# Patient Record
Sex: MALE | Race: OTHER | HISPANIC OR LATINO | Employment: UNEMPLOYED | URBAN - METROPOLITAN AREA
[De-identification: names, ages, dates, MRNs, and addresses within clinical notes are randomized per-mention and may not be internally consistent; named-entity substitution may affect disease eponyms.]

---

## 2020-07-08 ENCOUNTER — HOSPITAL ENCOUNTER (EMERGENCY)
Facility: HOSPITAL | Age: 70
Discharge: HOME | End: 2020-07-08
Attending: EMERGENCY MEDICINE | Admitting: EMERGENCY MEDICINE
Payer: COMMERCIAL

## 2020-07-08 ENCOUNTER — APPOINTMENT (EMERGENCY)
Dept: CT IMAGING | Facility: HOSPITAL | Age: 70
End: 2020-07-08
Payer: COMMERCIAL

## 2020-07-08 ENCOUNTER — APPOINTMENT (EMERGENCY)
Dept: ULTRASOUND IMAGING | Facility: HOSPITAL | Age: 70
End: 2020-07-08
Payer: COMMERCIAL

## 2020-07-08 VITALS
HEART RATE: 67 BPM | SYSTOLIC BLOOD PRESSURE: 184 MMHG | RESPIRATION RATE: 16 BRPM | TEMPERATURE: 97.5 F | OXYGEN SATURATION: 100 % | WEIGHT: 149.25 LBS | DIASTOLIC BLOOD PRESSURE: 81 MMHG

## 2020-07-08 DIAGNOSIS — E80.6 HYPERBILIRUBINEMIA: ICD-10-CM

## 2020-07-08 DIAGNOSIS — R31.9 HEMATURIA: ICD-10-CM

## 2020-07-08 DIAGNOSIS — E87.5 HYPERKALEMIA: ICD-10-CM

## 2020-07-08 DIAGNOSIS — N28.1 RENAL CYST: ICD-10-CM

## 2020-07-08 DIAGNOSIS — K74.60 CIRRHOSIS (HCC): ICD-10-CM

## 2020-07-08 DIAGNOSIS — K80.20 CHOLELITHIASIS: ICD-10-CM

## 2020-07-08 DIAGNOSIS — B20 HIV (HUMAN IMMUNODEFICIENCY VIRUS INFECTION) (HCC): ICD-10-CM

## 2020-07-08 DIAGNOSIS — R10.84 GENERALIZED ABDOMINAL PAIN: Primary | ICD-10-CM

## 2020-07-08 DIAGNOSIS — I81 PORTAL VEIN THROMBOSIS: ICD-10-CM

## 2020-07-08 LAB
ALBUMIN SERPL BCP-MCNC: 3.5 G/DL (ref 3.5–5)
ALP SERPL-CCNC: 74 U/L (ref 46–116)
ALT SERPL W P-5'-P-CCNC: 16 U/L (ref 12–78)
ANION GAP SERPL CALCULATED.3IONS-SCNC: 12 MMOL/L (ref 4–13)
APTT PPP: 30 SECONDS (ref 23–37)
AST SERPL W P-5'-P-CCNC: 50 U/L (ref 5–45)
BACTERIA UR QL AUTO: ABNORMAL /HPF
BASOPHILS # BLD AUTO: 0.02 THOUSANDS/ΜL (ref 0–0.1)
BASOPHILS NFR BLD AUTO: 0 % (ref 0–1)
BILIRUB SERPL-MCNC: 1.34 MG/DL (ref 0.2–1)
BILIRUB UR QL STRIP: NEGATIVE
BUN SERPL-MCNC: 20 MG/DL (ref 5–25)
CALCIUM SERPL-MCNC: 8.4 MG/DL (ref 8.3–10.1)
CHLORIDE SERPL-SCNC: 108 MMOL/L (ref 100–108)
CLARITY UR: CLEAR
CLARITY, POC: CLEAR
CO2 SERPL-SCNC: 19 MMOL/L (ref 21–32)
COLOR UR: YELLOW
COLOR, POC: YELLOW
CREAT SERPL-MCNC: 0.94 MG/DL (ref 0.6–1.3)
EOSINOPHIL # BLD AUTO: 0.12 THOUSAND/ΜL (ref 0–0.61)
EOSINOPHIL NFR BLD AUTO: 3 % (ref 0–6)
ERYTHROCYTE [DISTWIDTH] IN BLOOD BY AUTOMATED COUNT: 15.6 % (ref 11.6–15.1)
GFR SERPL CREATININE-BSD FRML MDRD: 82 ML/MIN/1.73SQ M
GLUCOSE SERPL-MCNC: 122 MG/DL (ref 65–140)
GLUCOSE UR STRIP-MCNC: NEGATIVE MG/DL
HCT VFR BLD AUTO: 40.4 % (ref 36.5–49.3)
HGB BLD-MCNC: 13.3 G/DL (ref 12–17)
HGB UR QL STRIP.AUTO: ABNORMAL
IMM GRANULOCYTES # BLD AUTO: 0.01 THOUSAND/UL (ref 0–0.2)
IMM GRANULOCYTES NFR BLD AUTO: 0 % (ref 0–2)
INR PPP: 1.17 (ref 0.84–1.19)
KETONES UR STRIP-MCNC: NEGATIVE MG/DL
LACTATE SERPL-SCNC: 1.6 MMOL/L (ref 0.5–2)
LEUKOCYTE ESTERASE UR QL STRIP: NEGATIVE
LIPASE SERPL-CCNC: 142 U/L (ref 73–393)
LYMPHOCYTES # BLD AUTO: 1.37 THOUSANDS/ΜL (ref 0.6–4.47)
LYMPHOCYTES NFR BLD AUTO: 29 % (ref 14–44)
MAGNESIUM SERPL-MCNC: 2.1 MG/DL (ref 1.6–2.6)
MCH RBC QN AUTO: 34.7 PG (ref 26.8–34.3)
MCHC RBC AUTO-ENTMCNC: 32.9 G/DL (ref 31.4–37.4)
MCV RBC AUTO: 106 FL (ref 82–98)
MONOCYTES # BLD AUTO: 0.61 THOUSAND/ΜL (ref 0.17–1.22)
MONOCYTES NFR BLD AUTO: 13 % (ref 4–12)
NEUTROPHILS # BLD AUTO: 2.54 THOUSANDS/ΜL (ref 1.85–7.62)
NEUTS SEG NFR BLD AUTO: 55 % (ref 43–75)
NITRITE UR QL STRIP: NEGATIVE
NON-SQ EPI CELLS URNS QL MICRO: ABNORMAL /HPF
NRBC BLD AUTO-RTO: 0 /100 WBCS
PH UR STRIP.AUTO: 5 [PH] (ref 4.5–8)
PLATELET # BLD AUTO: 69 THOUSANDS/UL (ref 149–390)
PMV BLD AUTO: 11.8 FL (ref 8.9–12.7)
POTASSIUM SERPL-SCNC: 5.8 MMOL/L (ref 3.5–5.3)
PROT SERPL-MCNC: 7.5 G/DL (ref 6.4–8.2)
PROT UR STRIP-MCNC: NEGATIVE MG/DL
PROTHROMBIN TIME: 15.1 SECONDS (ref 11.6–14.5)
RBC # BLD AUTO: 3.83 MILLION/UL (ref 3.88–5.62)
RBC #/AREA URNS AUTO: ABNORMAL /HPF
SODIUM SERPL-SCNC: 139 MMOL/L (ref 136–145)
SP GR UR STRIP.AUTO: >=1.03 (ref 1–1.03)
UROBILINOGEN UR QL STRIP.AUTO: 0.2 E.U./DL
WBC # BLD AUTO: 4.67 THOUSAND/UL (ref 4.31–10.16)
WBC #/AREA URNS AUTO: ABNORMAL /HPF

## 2020-07-08 PROCEDURE — 83690 ASSAY OF LIPASE: CPT | Performed by: NURSE PRACTITIONER

## 2020-07-08 PROCEDURE — 83605 ASSAY OF LACTIC ACID: CPT | Performed by: NURSE PRACTITIONER

## 2020-07-08 PROCEDURE — 99285 EMERGENCY DEPT VISIT HI MDM: CPT

## 2020-07-08 PROCEDURE — 36415 COLL VENOUS BLD VENIPUNCTURE: CPT | Performed by: NURSE PRACTITIONER

## 2020-07-08 PROCEDURE — 80053 COMPREHEN METABOLIC PANEL: CPT | Performed by: NURSE PRACTITIONER

## 2020-07-08 PROCEDURE — 85730 THROMBOPLASTIN TIME PARTIAL: CPT | Performed by: NURSE PRACTITIONER

## 2020-07-08 PROCEDURE — 85610 PROTHROMBIN TIME: CPT | Performed by: NURSE PRACTITIONER

## 2020-07-08 PROCEDURE — 96374 THER/PROPH/DIAG INJ IV PUSH: CPT

## 2020-07-08 PROCEDURE — 99285 EMERGENCY DEPT VISIT HI MDM: CPT | Performed by: EMERGENCY MEDICINE

## 2020-07-08 PROCEDURE — 74176 CT ABD & PELVIS W/O CONTRAST: CPT

## 2020-07-08 PROCEDURE — 85025 COMPLETE CBC W/AUTO DIFF WBC: CPT | Performed by: NURSE PRACTITIONER

## 2020-07-08 PROCEDURE — NC001 PR NO CHARGE: Performed by: SURGERY

## 2020-07-08 PROCEDURE — 76705 ECHO EXAM OF ABDOMEN: CPT

## 2020-07-08 PROCEDURE — 96361 HYDRATE IV INFUSION ADD-ON: CPT

## 2020-07-08 PROCEDURE — 96375 TX/PRO/DX INJ NEW DRUG ADDON: CPT

## 2020-07-08 PROCEDURE — 81001 URINALYSIS AUTO W/SCOPE: CPT

## 2020-07-08 PROCEDURE — 96376 TX/PRO/DX INJ SAME DRUG ADON: CPT

## 2020-07-08 PROCEDURE — 83735 ASSAY OF MAGNESIUM: CPT | Performed by: NURSE PRACTITIONER

## 2020-07-08 RX ORDER — HYDROMORPHONE HCL/PF 1 MG/ML
0.5 SYRINGE (ML) INJECTION ONCE
Status: COMPLETED | OUTPATIENT
Start: 2020-07-08 | End: 2020-07-08

## 2020-07-08 RX ORDER — ONDANSETRON 2 MG/ML
4 INJECTION INTRAMUSCULAR; INTRAVENOUS ONCE
Status: COMPLETED | OUTPATIENT
Start: 2020-07-08 | End: 2020-07-08

## 2020-07-08 RX ORDER — SODIUM CHLORIDE 9 MG/ML
125 INJECTION, SOLUTION INTRAVENOUS CONTINUOUS
Status: DISCONTINUED | OUTPATIENT
Start: 2020-07-08 | End: 2020-07-08 | Stop reason: HOSPADM

## 2020-07-08 RX ADMIN — IOHEXOL 50 ML: 240 INJECTION, SOLUTION INTRATHECAL; INTRAVASCULAR; INTRAVENOUS; ORAL at 06:53

## 2020-07-08 RX ADMIN — SODIUM CHLORIDE 125 ML/HR: 0.9 INJECTION, SOLUTION INTRAVENOUS at 04:18

## 2020-07-08 RX ADMIN — HYDROMORPHONE HYDROCHLORIDE 0.5 MG: 1 INJECTION, SOLUTION INTRAMUSCULAR; INTRAVENOUS; SUBCUTANEOUS at 07:18

## 2020-07-08 RX ADMIN — HYDROMORPHONE HYDROCHLORIDE 0.5 MG: 1 INJECTION, SOLUTION INTRAMUSCULAR; INTRAVENOUS; SUBCUTANEOUS at 04:10

## 2020-07-08 RX ADMIN — ONDANSETRON 4 MG: 2 INJECTION INTRAMUSCULAR; INTRAVENOUS at 04:09

## 2020-07-08 NOTE — CONSULTS
Consultation - Harrison ALTON Pierre 71 y o  male MRN: [id number]  Unit/Bed#: ED 17 Encounter: [id number]    Assessment/Plan     Assessment:  69yo M with HIV, cryptogenic cirrhosis, DM, HTN, HLD, TR, h/o choledocholithiasis s/p ERCP with stone removal c/b post-ERCP pancreatitis, who presents with symptomatic cholelithiasis and well as 2-3 mm ampullary stone  Tbili 1 34, but appears to be chronically elevated >1 3  Plan:  · CT and U/S imaging reviewed: low suspicion for acute cholecystitis, appears more c/w symptomatic cholelithiasis  · Patient follows with general surgery at [facility] for known gallbladder issues, and would prefer to continue to follow there should he require surgery  · Will attempt PO trial in ED; if patient able to tolerate PO intake, he can be discharged from ED to follow up with general surgery at [facility]      History of Present Illness     HPI:  Genie Miner is a 71 y o  male with PMHx of HIV, cryptogenic cirrhosis, DM, HTN, HLD, TR, h/o choledocholithiasis s/p ERCP with stone removal c/b post-ERCP pancreatitis, who presents with epigastric abdominal pain  He states the pain started last night after PO intake and persisted until this morning, prompting his visit to the ED  The pain has now resolved following BM earlier today  He is currently asymptomatic  He denies any fevers/nausea/vomiting/diarrhea or any associated symptoms  He has known cholelithiasis, for which he follows at [facility] and would prefer to do so moving forward  He has had abdominal surgery in the past, but it is unclear what procedure was performed (exploratory laparotomy with possible appendectomy?)  Consults    Review of Systems   All other systems reviewed and are negative    (except as stated in HPI)      Historical Information   Past Medical History:   Diagnosis Date    Acute biliary pancreatitis     Anemia     Ascites     Asthma     BPH (benign prostatic hyperplasia)     Cardiomegaly     Chronic [facility] ([facility] )     [facility]osis ([facility] )     CKD (chronic kidney disease)     Depression     Diabetes mellitus (HCC)     HOUSE (dyspnea on exertion)     Esophageal varices (HCC)     Gallstone     Glaucoma     Hepatic encephalopathy (HCC)     HIV (human immunodeficiency virus infection) (HCC)     Hyperlipidemia     Hypertension     IBS (irritable bowel syndrome)     Inguinal hernia     Nonrheumatic tricuspid valve regurgitation     Osteoarthritis     Portal hypertension (HCC)     Portal vein thrombosis     Pulmonary hypertension (HCC)     S/P ERCP     Thrombocytopenia (HCC)      Past Surgical History:   Procedure Laterality Date    ABDOMINAL SURGERY       Social History   Social History     Substance and Sexual Activity   Alcohol Use Never    Frequency: Never     Social History     Substance and Sexual Activity   Drug Use Never     E-Cigarette/Vaping     E-Cigarette/Vaping Substances     Social History     Tobacco Use   Smoking Status Never Smoker     Family History: History reviewed  No pertinent family history      Meds/Allergies   current meds:   Current Facility-Administered Medications   Medication Dose Route Frequency    sodium chloride 0 9 % infusion  125 mL/hr Intravenous Continuous     Allergies   Allergen Reactions    Dronabinol Other (See Comments)     Confusion and hallucinations    Hydrocodone-Acetaminophen Other (See Comments)    Iodinated Diagnostic Agents     Iothalamate Hives    Penicillins Other (See Comments)     Anaphylaxis; artem Rocephin    Ritonavir Other (See Comments)    Clindamycin Rash       Objective   First Vitals:   Blood Pressure: (!) 188/92 (07/08/20 0323)  Pulse: 58 (07/08/20 0323)  Temperature: 97 5 °F (36 4 °C) (07/08/20 0323)  Temp Source: Temporal (07/08/20 0323)  Respirations: 20 (07/08/20 0323)  Weight - Scale: 67 7 kg (149 lb 4 oz) (07/08/20 0323)  SpO2: 100 % (07/08/20 0323)    Current Vitals:   Blood Pressure: (!) 159/103 (07/08/20 0725)  Pulse: 86 (07/08/20 0725)  Temperature: 97 5 °F (36 4 °C) (07/08/20 0323)  Temp Source: Temporal (07/08/20 0323)  Respirations: 15 (07/08/20 0725)  Weight - Scale: 67 7 kg (149 lb 4 oz) (07/08/20 0323)  SpO2: 100 % (07/08/20 0725)    No intake or output data in the 24 hours ending 07/08/20 1016    Invasive Devices     Peripheral Intravenous Line            Peripheral IV 07/08/20 Right Forearm less than 1 day                Physical Exam   Constitutional: He is oriented to person, place, and time  He appears cachectic  No distress  HENT:   Head: Normocephalic and atraumatic  Eyes: EOM are normal    Neck: Normal range of motion  Cardiovascular: Normal rate, regular rhythm and intact distal pulses  Pulmonary/Chest: Effort normal  No respiratory distress  Abdominal: Soft  He exhibits no distension and no fluid wave  There is no tenderness  There is no rigidity, no rebound, no guarding and negative Griffin's sign  Genitourinary:   Genitourinary Comments: Deferred   Musculoskeletal: Normal range of motion  He exhibits no edema or deformity  Neurological: He is alert and oriented to person, place, and time  Skin: Skin is warm and dry  No rash noted  He is not diaphoretic  No erythema  No pallor  Vitals reviewed        Lab Results:   CBC:   Lab Results   Component Value Date    WBC 4 67 07/08/2020    HGB 13 3 07/08/2020    HCT 40 4 07/08/2020     (H) 07/08/2020    PLT 69 (L) 07/08/2020    MCH 34 7 (H) 07/08/2020    MCHC 32 9 07/08/2020    RDW 15 6 (H) 07/08/2020    MPV 11 8 07/08/2020    NRBC 0 07/08/2020   , CMP:   Lab Results   Component Value Date    SODIUM 139 07/08/2020    K 5 8 (H) 07/08/2020     07/08/2020    CO2 19 (L) 07/08/2020    BUN 20 07/08/2020    CREATININE 0 94 07/08/2020    CALCIUM 8 4 07/08/2020    AST 50 (H) 07/08/2020    ALT 16 07/08/2020    ALKPHOS 74 07/08/2020    EGFR 82 07/08/2020   Coagulation:   Lab Results   Component Value Date    INR 1 17 07/08/2020   Lipase:   Lab Results Component Value Date    LIPASE 142 07/08/2020     Imaging: I have personally reviewed pertinent reports  US gallbladder   Final Result by Antonio Condon MD (07/08 5024)   1  Cholelithiasis with mild gallbladder wall thickening but no significant pericholecystic fluid, CBD dilatation or sonographic Griffin's sign  Findings remain equivocal for cholecystitis  2   Right lower renal pole cortical cyst      3   Heterogeneous hepatic echotexture with suspected thrombosis of the portal vein  A contrast enhanced CT should be confirmatory if clinically appropriate  The study was marked in EPIC for significant notification  Workstation performed: [id number]         CT abdomen pelvis wo contrast   Final Result by Margaret Ocampo MD (07/08 9142)      3 x 2 mm stone seen at the ampulla  No significant dilatation of the intra or extrahepatic bile ducts on this unenhanced exam   Gallbladder is nondistended but does show evidence of stones and mild pericholecystic infiltration  Nonspecific peripheral reticular interstitial thickening and minimal subsegmental opacity in the right lung base  The study was marked in [facility] for immediate notification  Workstation performed: [id number]            EKG, Pathology, and Other Studies: I have personally reviewed pertinent reports

## 2020-07-08 NOTE — DISCHARGE INSTRUCTIONS
DISCHARGE INSTRUCTIONS:    FOLLOW UP WITH YOUR PRIMARY CARE PROVIDER OR THE [address] [place] [address]  MAKE AN APPOINTMENT TO BE SEEN  REST AND DRINK PLENTY OF FLUIDS  FOLLOW UP WITH YOUR SURGEON  IF SYMPTOMS WORSEN OR NEW SYMPTOMS ARISE, RETURN TO THE ER TO BE SEEN

## 2020-07-08 NOTE — ED PROVIDER NOTES
History  Chief Complaint   Patient presents with    Abdominal Pain     Pt reports mid quad abd pain since last night  Pt denies NVD  Denies fever  This is a cachetic appearing 71year old male with a anorexia, HIV, HTN, cryptogenic cirrhosis, autonomic dysfunction, mod-severe TR htn, dm and biliary pancreatitis, April this year for latter and s/p ercp w/ stone removal   He states he has had abdominal surgery but is unable to elaborate on specific surgery  He denies fevers, v/d   Last BM yesterday    All patient care done at [facility] THE [facility]      Past Medical History:  Diagnosis Date   Anemia    Asthma    BPH (benign prostatic hyperplasia)    Cardiomegaly    Chronic hepatitis (HCC)    Cirrhosis (HCC)    CKD (chronic kidney disease)    HOUSE (dyspnea on exertion)    Esophageal varices (HCC)    Glaucoma    Hepatic encephalopathy (HCC)    HIV (human immunodeficiency virus infection) (HCC)    Hyperlipidemia    Hypertension    IBS (irritable bowel syndrome)    Inguinal hernia    Nonrheumatic tricuspid valve regurgitation    Osteoarthritis    Portal hypertension (HCC)    Portal vein thrombosis    Pulmonary hypertension (HCC)    Thrombocytopenia (HCC)     5/27/20  CD4 25  CD4 absolute 328       Abdominal Pain   Associated symptoms: nausea        None       Past Medical History:   Diagnosis Date    Acute biliary pancreatitis     Anemia     Ascites     Asthma     BPH (benign prostatic hyperplasia)     Cardiomegaly     Chronic hepatitis (HCC)     Cirrhosis (HCC)     CKD (chronic kidney disease)     Depression     Diabetes mellitus (HCC)     HOUSE (dyspnea on exertion)     Esophageal varices (HCC)     Gallstone     Glaucoma     Hepatic encephalopathy (HCC)     HIV (human immunodeficiency virus infection) ([address] )     Hyperlipidemia     Hypertension     IBS (irritable bowel syndrome)     Inguinal hernia     Nonrheumatic tricuspid valve regurgitation     Osteoarthritis     Portal hypertension ([address] )  Portal vein thrombosis     Pulmonary hypertension (HCC)     S/P ERCP     Thrombocytopenia (HCC)        Past Surgical History:   Procedure Laterality Date    ABDOMINAL SURGERY         History reviewed  No pertinent family history  I have reviewed and agree with the history as documented  E-Cigarette/Vaping     E-Cigarette/Vaping Substances     Social History     Tobacco Use    Smoking status: Never Smoker   Substance Use Topics    Alcohol use: Never     Frequency: Never    Drug use: Never       Review of Systems   Gastrointestinal: Positive for abdominal distention, abdominal pain and nausea  All other systems reviewed and are negative  Physical Exam  Physical Exam   Constitutional: He is oriented to person, place, and time  Non-toxic appearance  He does not appear ill  He appears distressed  Cachetic appearing male  Rubbing his abdomen and moaning    HENT:   Head: Normocephalic and atraumatic  Eyes: Pupils are equal, round, and reactive to light  EOM are normal    Cardiovascular: Normal rate, regular rhythm and normal heart sounds  Pulmonary/Chest: Effort normal and breath sounds normal    Abdominal: Bowel sounds are normal  There is generalized tenderness and tenderness in the epigastric area  Neurological: He is alert and oriented to person, place, and time  Skin: Skin is warm and dry  Capillary refill takes less than 2 seconds  Psychiatric: He has a normal mood and affect  His behavior is normal    Nursing note and vitals reviewed        Vital Signs  ED Triage Vitals   Temperature Pulse Respirations Blood Pressure SpO2   07/08/20 0323 07/08/20 0323 07/08/20 0323 07/08/20 0323 07/08/20 0323   97 5 °F (36 4 °C) 58 20 (!) 188/92 100 %      Temp Source Heart Rate Source Patient Position - Orthostatic VS BP Location FiO2 (%)   07/08/20 0323 07/08/20 0323 07/08/20 0453 07/08/20 0323 --   Temporal Monitor Lying Right arm       Pain Score       07/08/20 0410       Worst Possible Pain Vitals:    07/08/20 0323 07/08/20 0430 07/08/20 0453   BP: (!) 188/92 (!) 189/86 (!) 189/89   Pulse: 58 60 60   Patient Position - Orthostatic VS:   Lying         Visual Acuity      ED Medications  Medications   sodium chloride 0 9 % infusion (125 mL/hr Intravenous New Bag 7/8/20 0418)   ondansetron (ZOFRAN) injection 4 mg (4 mg Intravenous Given 7/8/20 0409)   HYDROmorphone (DILAUDID) injection 0 5 mg (0 5 mg Intravenous Given 7/8/20 0410)   iohexol (OMNIPAQUE) 240 MG/ML solution 50 mL (50 mL Oral Given 7/8/20 0653)       Diagnostic Studies  Results Reviewed     Procedure Component Value Units Date/Time    Urine Microscopic [[id number]]  (Abnormal) Collected:  07/08/20 0500    Lab Status:  Final result Specimen:  Urine, Other Updated:  07/08/20 0559     RBC, UA 1-2 /hpf      WBC, UA None Seen /hpf      Epithelial Cells Occasional /hpf      Bacteria, UA Occasional /hpf     Comprehensive metabolic panel [[id number]]  (Abnormal) Collected:  07/08/20 0411    Lab Status:  Final result Specimen:  Blood from Arm, Right Updated:  07/08/20 0555     Sodium 139 mmol/L      Potassium 5 8 mmol/L      Chloride 108 mmol/L      CO2 19 mmol/L      ANION GAP 12 mmol/L      BUN 20 mg/dL      Creatinine 0 94 mg/dL      Glucose 122 mg/dL      Calcium 8 4 mg/dL      AST 50 U/L      ALT 16 U/L      Alkaline Phosphatase 74 U/L      Total Protein 7 5 g/dL      Albumin 3 5 g/dL      Total Bilirubin 1 34 mg/dL      eGFR 82 ml/min/1 73sq m     Narrative:       [place] guidelines for Chronic Kidney Disease (CKD):     Stage 1 with normal or high GFR (GFR > 90 mL/min/1 73 square meters)    Stage 2 Mild CKD (GFR = 60-89 mL/min/1 73 square meters)    Stage 3A Moderate CKD (GFR = 45-59 mL/min/1 73 square meters)    Stage 3B Moderate CKD (GFR = 30-44 mL/min/1 73 square meters)    Stage 4 Severe CKD (GFR = 15-29 mL/min/1 73 square meters)    Stage 5 End Stage CKD (GFR <15 mL/min/1 73 square meters)  Note: GFR calculation is accurate only with a steady state creatinine    Magnesium [[id number]]  (Normal) Collected:  07/08/20 0411    Lab Status:  Final result Specimen:  Blood from Arm, Right Updated:  07/08/20 0555     Magnesium 2 1 mg/dL     Lipase [[id number]]  (Normal) Collected:  07/08/20 0411    Lab Status:  Final result Specimen:  Blood from Arm, Right Updated:  07/08/20 0555     Lipase 142 u/L     Lactic acid [[id number]]  (Normal) Collected:  07/08/20 0411    Lab Status:  Final result Specimen:  Blood from Arm, Right Updated:  07/08/20 0547     LACTIC ACID 1 6 mmol/L     Narrative:       Result may be elevated if tourniquet was used during collection      CBC and differential [[id number]]  (Abnormal) Collected:  07/08/20 0411    Lab Status:  Final result Specimen:  Blood from Arm, Right Updated:  07/08/20 0504     WBC 4 67 Thousand/uL      RBC 3 83 Million/uL      Hemoglobin 13 3 g/dL      Hematocrit 40 4 %       fL      MCH 34 7 pg      MCHC 32 9 g/dL      RDW 15 6 %      MPV 11 8 fL      Platelets 69 Thousands/uL      nRBC 0 /100 WBCs      Neutrophils Relative 55 %      Immat GRANS % 0 %      Lymphocytes Relative 29 %      Monocytes Relative 13 %      Eosinophils Relative 3 %      Basophils Relative 0 %      Neutrophils Absolute 2 54 Thousands/µL      Immature Grans Absolute 0 01 Thousand/uL      Lymphocytes Absolute 1 37 Thousands/µL      Monocytes Absolute 0 61 Thousand/µL      Eosinophils Absolute 0 12 Thousand/µL      Basophils Absolute 0 02 Thousands/µL     POCT urinalysis dipstick [[id number]]  (Abnormal) Resulted:  07/08/20 0503    Lab Status:  Final result Specimen:  Urine Updated:  07/08/20 0504     Color, UA yellow     Clarity, UA clear    Urine Macroscopic, POC [[id number]]  (Abnormal) Collected:  07/08/20 0500    Lab Status:  Final result Specimen:  Urine Updated:  07/08/20 0502     Color, UA Yellow     Clarity, UA Clear     pH, UA 5 0     Leukocytes, UA Negative     Nitrite, UA Negative     Protein, UA Negative mg/dl      Glucose, UA Negative mg/dl      Ketones, UA Negative mg/dl      Urobilinogen, UA 0 2 E U /dl      Bilirubin, UA Negative     Blood, UA Small     Specific Gravity, UA >=1 030    Conor Milton RESULT    Protime-INR [[id number]]  (Abnormal) Collected:  07/08/20 0411    Lab Status:  Final result Specimen:  Blood from Arm, Right Updated:  07/08/20 0458     Protime 15 1 seconds      INR 1 17    APTT [[id number]]  (Normal) Collected:  07/08/20 0411    Lab Status:  Final result Specimen:  Blood from Arm, Right Updated:  07/08/20 0458     PTT 30 seconds                  CT abdomen pelvis wo contrast   Final Result by Damian Staley MD (07/08 0659)      3 x 2 mm stone seen at the ampulla  No significant dilatation of the intra or extrahepatic bile ducts on this unenhanced exam   Gallbladder is nondistended but does show evidence of stones and mild pericholecystic infiltration  Nonspecific peripheral reticular interstitial thickening and minimal subsegmental opacity in the right lung base  The study was marked in [facility] for immediate notification  Workstation performed: [id number]         US gallbladder    (Results Pending)              Procedures  Procedures         ED Course  ED Course as of Jul 08 0709   Wed Jul 08, 2020   1660 RN states that pt had called him to room and stated he needed something else for pain, however when he returned to pt room he was sleeping         0611 K 5 8 slightly hemolyzed - pt is getting IVF  Potassium(!): 5 8   0612 LACTIC ACID: 1 6   0612 Magnesium: 2 1   0612 WBC: 4 67   0617 TOTAL BILIRUBIN(!): 1 34   0656 Labs reviewed no acute process  Waiting on rad read for dispo  Report to Nirav Bartlett [address] [place] [address] for review of CT and discussion with pt and dispo        0709 Bonnie JOVEL PAC will order US                                                  MDM  Number of Diagnoses or Management Options  Diagnosis management comments: Abdominal pain  (hx of HIV, cirrhosis, abdominal surgery)    DDX:   Ascites,  Constipation, SBO,     Plan          Disposition  Final diagnoses:   Generalized abdominal pain   Cirrhosis ([address] )   HIV [facility] immunodeficiency virus infection) ([facility] )     Time reflects when diagnosis was documented in both MDM as applicable and the Disposition within this note     Time User Action Codes Description Comment    7/8/2020  6:13 AM Sanna Lisa [R10 84] Generalized abdominal pain     7/8/2020  6:14 AM Chhaya Thompson [K74 60] [facility] ([facility] )     7/8/2020  6:14 AM Sanna Lisa [B20] HIV (human immunodeficiency virus infection) [facility])       ED Disposition     None      Follow-up Information    None         Patient's Medications    No medications on file     No discharge procedures on file      PDMP Review     None          ED Provider  Electronically Signed by           Benjamin House  07/08/20 7941

## 2020-07-08 NOTE — ED NOTES
Pt transported to [address] [place] [address], [address] [place] [address]  07/08/20 6372

## 2020-07-08 NOTE — ED CARE HANDOFF
Emergency Department Sign Out Note        Sign out and transfer of care from [facility]  See Separate Emergency Department note  The patient, Nico Figeuroa, was evaluated by the previous provider for mid abdominal pain  Workup Completed:  Urine  CMP  Magnesium  Lipase  Lactic  CBC  PT/INR  PTT    ED Course / Workup Pending (followup):  CT abdomen                          ED Course as of Jul 08 1152   Wed Jul 08, 2020   1413 Awaiting CT  Will decide disposition after  [address] with General surgery in regards to CT and US findings  They will see patient  [address] with Moreno Coles from general surgery  If patient can tolerate PO and not have pain, he can follow up with surgeon at [facility] for outpatient cholecystectomy  Will PO challenge  1119 Patient tolerating PO liquid and food without pain  Will discharge  Procedures  MDM  Number of Diagnoses or Management Options  Cholelithiasis: new and requires workup  Cirrhosis ([address] ): new and requires workup  Generalized abdominal pain: new and requires workup  Hematuria: new and requires workup  HIV (human immunodeficiency virus infection) ([address] ): new and requires workup  Hyperbilirubinemia: new and requires workup  Hyperkalemia: new and requires workup  Portal vein thrombosis: new and requires workup  Renal cyst: new and requires workup  Diagnosis management comments:   Per previous provider, plan is to await CT and decide disposition  CT shows stone at ampulla  Will check US to ensure no signs of acute cholecystitis  Patient agreeable to plan  Patient states he knows about this stone and is following with [facility] for it  He states he was told he needed to have it removed but has not scheduled an appointment yet  [address] with General surgery in regards to CT and US findings  They will see patient  [address] with Moreno Coles from general surgery   If patient can tolerate PO and not have pain, he can follow up with surgeon at [facility] for outpatient cholecystectomy  Will PO challenge  1119 Patient tolerating PO liquid and food without pain  Will discharge  Patient agreeable  At discharge, I instructed the patient to:  -follow up with pcp  -rest and drink plenty of fluids  -follow up with general surgery  -return to the ER if symptoms worsened or new symptoms arose  Patient agreed to this plan and was stable at time of discharge  Amount and/or Complexity of Data Reviewed  Clinical lab tests: ordered and reviewed  Tests in the radiology section of CPT®: ordered and reviewed  Review and summarize past medical records: yes  Discuss the patient with other providers: yes    Patient Progress  Patient progress: stable      Disposition  Final diagnoses:   Generalized abdominal pain   Cirrhosis ([facility] )   HIV (human immunodeficiency virus infection) ([facility] )     Time reflects when diagnosis was documented in both MDM as applicable and the Disposition within this note     Time User Action Codes Description Comment    7/8/2020  6:13 AM Avel Lisa [R10 84] Generalized abdominal pain     7/8/2020  6:14 AM Idania Ellington [K74 60] [facility] ([facility]     7/8/2020  6:14 AM Avel Lisa [B20] HIV (human immunodeficiency virus infection) [facility])       ED Disposition     None      Follow-up Information    None       Patient's Medications    No medications on file     No discharge procedures on file         ED Provider  Electronically Signed by     Gerald Davila PA-C  07/08/20 3473